# Patient Record
Sex: MALE | URBAN - METROPOLITAN AREA
[De-identification: names, ages, dates, MRNs, and addresses within clinical notes are randomized per-mention and may not be internally consistent; named-entity substitution may affect disease eponyms.]

---

## 2017-07-27 ENCOUNTER — OFFICE VISIT (OUTPATIENT)
Dept: MEDICAL GROUP | Facility: PHYSICIAN GROUP | Age: 82
End: 2017-07-27
Payer: MEDICARE

## 2017-07-27 VITALS
SYSTOLIC BLOOD PRESSURE: 124 MMHG | HEIGHT: 71 IN | TEMPERATURE: 98.4 F | WEIGHT: 202 LBS | DIASTOLIC BLOOD PRESSURE: 68 MMHG | OXYGEN SATURATION: 92 % | BODY MASS INDEX: 28.28 KG/M2 | HEART RATE: 70 BPM

## 2017-07-27 DIAGNOSIS — Z00.00 WELL ADULT EXAM: ICD-10-CM

## 2017-07-27 DIAGNOSIS — E78.00 PURE HYPERCHOLESTEROLEMIA: ICD-10-CM

## 2017-07-27 DIAGNOSIS — R41.3 MEMORY LOSS: ICD-10-CM

## 2017-07-27 DIAGNOSIS — Z23 NEED FOR TDAP VACCINATION: ICD-10-CM

## 2017-07-27 DIAGNOSIS — I10 BENIGN ESSENTIAL HTN: ICD-10-CM

## 2017-07-27 DIAGNOSIS — Z86.73 HISTORY OF TIA (TRANSIENT ISCHEMIC ATTACK): ICD-10-CM

## 2017-07-27 DIAGNOSIS — Z23 NEED FOR PNEUMOCOCCAL VACCINATION: ICD-10-CM

## 2017-07-27 DIAGNOSIS — Z00.00 MEDICARE ANNUAL WELLNESS VISIT, SUBSEQUENT: ICD-10-CM

## 2017-07-27 PROCEDURE — 90670 PCV13 VACCINE IM: CPT | Performed by: FAMILY MEDICINE

## 2017-07-27 PROCEDURE — 90472 IMMUNIZATION ADMIN EACH ADD: CPT | Performed by: FAMILY MEDICINE

## 2017-07-27 PROCEDURE — 90715 TDAP VACCINE 7 YRS/> IM: CPT | Performed by: FAMILY MEDICINE

## 2017-07-27 PROCEDURE — G0009 ADMIN PNEUMOCOCCAL VACCINE: HCPCS | Performed by: FAMILY MEDICINE

## 2017-07-27 PROCEDURE — G0439 PPPS, SUBSEQ VISIT: HCPCS | Mod: 25 | Performed by: FAMILY MEDICINE

## 2017-07-27 RX ORDER — CITALOPRAM HYDROBROMIDE 10 MG/1
10 TABLET ORAL DAILY
Qty: 90 TAB | Refills: 3 | Status: SHIPPED | OUTPATIENT
Start: 2017-07-27

## 2017-07-27 RX ORDER — PRAVASTATIN SODIUM 10 MG
10 TABLET ORAL DAILY
Qty: 90 TAB | Refills: 3 | Status: SHIPPED | OUTPATIENT
Start: 2017-07-27

## 2017-07-27 RX ORDER — DONEPEZIL HYDROCHLORIDE 5 MG/1
5 TABLET, FILM COATED ORAL EVERY EVENING
Qty: 30 TAB | Refills: 3 | Status: SHIPPED | OUTPATIENT
Start: 2017-07-27 | End: 2017-11-26 | Stop reason: SDUPTHER

## 2017-07-27 NOTE — MR AVS SNAPSHOT
"        Sami Modi   2017 1:30 PM   Office Visit   MRN: [id number]    Department:  [facility]    Dept Phone:  [phone]    Description:  Male : 1927   Provider:  Ren Tellez M.D.           Allergies as of 2017     No Known Allergies      You were diagnosed with     Medicare annual wellness visit, subsequent   [736201]       Need for Tdap vaccination   [064487]       Need for pneumococcal vaccination   [874459]       Well adult exam   [584218]       History of TIA (transient ischemic attack)   [699175]       Pure hypercholesterolemia   [272.0.ICD-9-CM]       Benign essential HTN   [124003]       Memory loss   [780.93.ICD-9-CM]         Vital Signs     Blood Pressure Pulse Temperature Height Weight Body Mass Index    124/68 mmHg 70 36.9 °C (98.4 °F) 1.803 m (5' 11\") 91.627 kg (202 lb) 28.19 kg/m2    Oxygen Saturation Smoking Status                92% Never Smoker           Basic Information     Date Of Birth Sex Race Ethnicity Preferred Language    1927 Male Unable to Obtain Unknown English      Problem List              ICD-10-CM Priority Class Noted - Resolved    History of TIA (transient ischemic attack) Z86.73   2016 - Present    HLD (hyperlipidemia) E78.5   2016 - Present    Benign essential HTN I10   2016 - Present      Health Maintenance        Date Due Completion Dates    IMM DTaP/Tdap/Td Vaccine (1 - Tdap) 1946 ---    COLONOSCOPY 1977 ---    IMM ZOSTER VACCINE 1987 ---    IMM PNEUMOCOCCAL 65+ (ADULT) LOW/MEDIUM RISK SERIES (1 of 2 - PCV13) 1992 ---    IMM INFLUENZA (1) 2017 ---            Current Immunizations     13-VALENT PCV PREVNAR 2017    Tdap Vaccine 2017      Below and/or attached are the medications your provider expects you to take. Review all of your home medications and newly ordered medications with your provider and/or pharmacist. Follow medication instructions as directed by your provider and/or pharmacist. " Please keep your medication list with you and share with your provider. Update the information when medications are discontinued, doses are changed, or new medications (including over-the-counter products) are added; and carry medication information at all times in the event of emergency situations     Allergies:  No Known Allergies          Medications  Valid as of: July 27, 2017 -  4:05 PM    Generic Name Brand Name Tablet Size Instructions for use    Aspirin (Tablet Delayed Response) ECOTRIN 81 MG Take 81 mg by mouth every day.        Cholecalciferol (Cap) Vitamin D3 1000 UNITS Take  by mouth.        Citalopram Hydrobromide (Tab) CELEXA 10 MG Take 1 Tab by mouth every day.        Cyanocobalamin (Cap) B-12 1000 MCG Take  by mouth.        Donepezil HCl (Tab) ARICEPT 5 MG Take 1 Tab by mouth every evening.        Magnesium Gluconate (Tab) MAG-G 500 MG Take 500 mg by mouth 3 times a day.        Metoprolol Tartrate (Tab) LOPRESSOR 25 MG Take 1 Tab by mouth 2 times a day.        Multiple Vitamins-Minerals   Take  by mouth.        Pravastatin Sodium (Tab) PRAVACHOL 10 MG Take 1 Tab by mouth every day.        Triamcinolone Acetonide (Cream) KENALOG 0.025 % Apply 1 Application to affected area(s) 2 times a day.        .                 Medicines prescribed today were sent to:     [facility] #[id number] - [place] [address] [address] [address]    Phone: [phone] Fax: [phone]    Open 24 Hours?: No      Medication refill instructions:       If your prescription bottle indicates you have medication refills left, it is not necessary to call your provider’s office. Please contact your pharmacy and they will refill your medication.    If your prescription bottle indicates you do not have any refills left, you may request refills at any time through one of the following ways: The online Perfectore system (except Urgent Care), by calling your provider’s office, or by asking your  pharmacy to contact your provider’s office with a refill request. Medication refills are processed only during regular business hours and may not be available until the next business day. Your provider may request additional information or to have a follow-up visit with you prior to refilling your medication.   *Please Note: Medication refills are assigned a new Rx number when refilled electronically. Your pharmacy may indicate that no refills were authorized even though a new prescription for the same medication is available at the pharmacy. Please request the medicine by name with the pharmacy before contacting your provider for a refill.        Your To Do List     Future Labs/Procedures Complete By Expires    CBC WITHOUT DIFFERENTIAL  As directed 1/27/2018    COMP METABOLIC PANEL  As directed 7/27/2018    FREE THYROXINE  As directed 7/27/2018    LIPID PROFILE  As directed 7/27/2018    OCCULT BLOOD FECES IMMUNOASSAY  As directed 7/27/2018    TRIIDOTHYRONINE  As directed 7/27/2018    TSH  As directed 7/27/2018    VITAMIN D,25 HYDROXY  As directed 7/27/2018         BrandProject Access Code: [email or web address]  Expires: 8/26/2017  4:05 PM    BrandProject  A secure, online tool to manage your health information     [employer]’s BrandProject® is a secure, online tool that connects you to your personalized health information from the privacy of your home -- day or night - making it very easy for you to manage your healthcare. Once the activation process is completed, you can even access your medical information using the BrandProject greg, which is available for free in the Apple Greg store or Google Play store.     BrandProject provides the following levels of access (as shown below):   My Chart Features   [facility] Doctor [facility]  Specialists [facility]  Urgent  Care [facility]  Primary Care  Doctor   Email your healthcare team securely and privately 24/7 X X X    Manage appointments: schedule your next appointment; view details of  past/upcoming appointments X      Request prescription refills. X      View recent personal medical records, including lab and immunizations X X X X   View health record, including health history, allergies, medications X X X X   Read reports about your outpatient visits, procedures, consult and ER notes X X X X   See your discharge summary, which is a recap of your hospital and/or ER visit that includes your diagnosis, lab results, and care plan. X X       How to register for Navigating Cancer:  1. Go to  [email or web address].  2. Click on the Sign Up Now box, which takes you to the New Member Sign Up page. You will need to provide the following information:  a. Enter your Navigating Cancer Access Code exactly as it appears at the top of this page. (You will not need to use this code after you’ve completed the sign-up process. If you do not sign up before the expiration date, you must request a new code.)   b. Enter your date of birth.   c. Enter your home email address.   d. Click Submit, and follow the next screen’s instructions.  3. Create a Navigating Cancer ID. This will be your Navigating Cancer login ID and cannot be changed, so think of one that is secure and easy to remember.  4. Create a Navigating Cancer password. You can change your password at any time.  5. Enter your Password Reset Question and Answer. This can be used at a later time if you forget your password.   6. Enter your e-mail address. This allows you to receive e-mail notifications when new information is available in Navigating Cancer.  7. Click Sign Up. You can now view your health information.    For assistance activating your Navigating Cancer account, call [phone]

## 2017-07-27 NOTE — PROGRESS NOTES
No chief complaint on file.        HPI:  Sami is a 89 y.o. here for Medicare Annual Wellness Visit        Patient Active Problem List    Diagnosis Date Noted   • History of TIA (transient ischemic attack) 07/27/2016   • HLD (hyperlipidemia) 07/27/2016   • Benign essential HTN 07/27/2016   • Low back pain        Current Outpatient Prescriptions   Medication Sig Dispense Refill   • Multiple Vitamins-Minerals (CENTRUM PO) Take  by mouth.     • aspirin EC (ECOTRIN) 81 MG Tablet Delayed Response Take 81 mg by mouth every day.     • Cholecalciferol (VITAMIN D3) 1000 UNITS Cap Take  by mouth.     • Cyanocobalamin (B-12) 1000 MCG Cap Take  by mouth.     • pravastatin (PRAVACHOL) 10 MG Tab Take 1 Tab by mouth every day. 90 Tab 3   • metoprolol (LOPRESSOR) 25 MG Tab Take 1 Tab by mouth 2 times a day. 180 Tab 3   • citalopram (CELEXA) 10 MG tablet Take 1 Tab by mouth every day. 90 Tab 3   • magnesium gluconate (MAG-G) 500 MG tablet Take 500 mg by mouth 3 times a day.     • triamcinolone acetonide (KENALOG) 0.025 % Cream Apply 1 Application to affected area(s) 2 times a day. 1 Tube 3     No current facility-administered medications for this visit.        The patient is not taking no medication(s) due to:  no prescription for the medication.  Current supplements as per medication list.     Allergies: Review of patient's allergies indicates no known allergies.    Current social contact/activities: likes to read a book or play on the computer.    Is patient current with immunizations? No, due for PREVNAR (PCV13) . Patient is interested in receiving PREVNAR (PCV13)  today.    He  reports that he has never smoked. He has never used smokeless tobacco. He reports that he does not drink alcohol or use illicit drugs.  Counseling given: Not Answered        DPA/Advanced directive: Patient does not have an Advanced Directive.  A packet and workshop information was given on Advanced Directives.    ROS:    Gait: Uses a cane yes  Ostomy: no  no  Other tubes: no no  Amputations: no no  Chronic oxygen use no no  Last eye exam 2/5/2014  Wears hearing aids: no yes  : Reports incontinence. yes      Screening:      Depression Screening    Little interest or pleasure in doing things?   0  Feeling down, depressed, or hopeless?  0  Trouble falling or staying asleep, or sleeping too much?   0  Feeling tired or having little energy?   0  Poor appetite or overeating?     Feeling bad about yourself - or that y0ou are a failure or have let yourself or your family down?    Trouble concentrating on things, such as reading the newspaper or watching television?    Moving or speaking so slowly that other people could have noticed.  Or the opposite - being so fidgety or restless that you have been moving around a lot more than usual?     Thoughts that you would be better off dead, or of hurting yourself?     Patient Health Questionnaire Score:        If depressive symptoms identified deferred to follow up visit unless specifically addressed in assessment and plan.    Interpretation of PHQ-9 Total Score   Score Severity   1-4 No Depression   5-9 Mild Depression   10-14 Moderate Depression   15-19 Moderately Severe Depression   20-27 Severe Depression      Screening for Cognitive Impairment    Three Minute Recall (apple, watch, singh)   0/3    Draw clock face with all 12 numbers set to the hand to show 10 minutes past 11 o'clock       If cognitive concerns identified, deferred for follow up unless specifically addressed in assessment and plan.    Fall Risk Assessment    Has the patient had two or more falls in the last year or any fall with injury in the last year?   no  If fall risk identified, deferred for follow up unless specifically addressed in assessment and plan.      Safety Assessment    Throw rugs on floor.   yes  Handrails on all stairs.   No stairs  Good lighting in all hallways.   yes  Difficulty hearing.   yes  Patient counseled about all safety risks that  "were identified.    Functional Assessment ADLs    Are there any barriers preventing you from cooking for yourself or meeting nutritional needs?   .  no  Are there any barriers preventing you from driving safely or obtaining transportation?   .  no  Are there any barriers preventing you from using a telephone or calling for help?   .  yes  Are there any barriers preventing you from shopping?   .  yes  Are there any barriers preventing you from taking care of your own finances?   .  yes  Are there any barriers preventing you from managing your medications?   .  yes  Are you currently engaging any exercise or physical activity?   .   no   Health Maintenance Summary                Annual Wellness Visit Overdue 7/27/1927     IMM DTaP/Tdap/Td Vaccine Overdue 7/27/1946     COLONOSCOPY Overdue 7/27/1977     IMM ZOSTER VACCINE Overdue 7/27/1987     IMM PNEUMOCOCCAL 65+ (ADULT) LOW/MEDIUM RISK SERIES Overdue 7/27/1992     IMM INFLUENZA Next Due 9/1/2017           Patient Care Team:  Ren Tellez M.D. as PCP - General (Family Medicine)      Social History   Substance Use Topics   • Smoking status: Never Smoker    • Smokeless tobacco: Never Used   • Alcohol Use: No     History reviewed. No pertinent family history.  He  has a past medical history of Hyperlipidemia; Hypertension; LBP (low back pain); and Stroke (CMS-HCC).   Past Surgical History   Procedure Laterality Date   • Abdominal exploration     • Appendectomy     • Bowel resection       due to sbo         Exam:     Blood pressure 124/68, pulse 70, temperature 36.9 °C (98.4 °F), height 1.803 m (5' 11\"), weight 91.627 kg (202 lb), SpO2 92 %. Body mass index is 28.19 kg/(m^2).    Hearing fair.    Dentition fair  Alert, oriented in no acute distress.  Eye contact is good, speech goal directed, affect calm      Assessment and Plan. The following treatment and monitoring plan is recommended:      1. Medicare annual wellness visit, subsequent    - OCCULT BLOOD FECES " IMMUNOASSAY; Future  - COMP METABOLIC PANEL; Future  - FREE THYROXINE; Future  - TRIIDOTHYRONINE; Future  - TSH; Future  - VITAMIN D,25 HYDROXY; Future  - LIPID PROFILE; Future  - CBC WITHOUT DIFFERENTIAL; Future    2. Need for Tdap vaccination    - OCCULT BLOOD FECES IMMUNOASSAY; Future  - COMP METABOLIC PANEL; Future  - FREE THYROXINE; Future  - TRIIDOTHYRONINE; Future  - TSH; Future  - VITAMIN D,25 HYDROXY; Future  - LIPID PROFILE; Future  - CBC WITHOUT DIFFERENTIAL; Future    3. Need for pneumococcal vaccination    - OCCULT BLOOD FECES IMMUNOASSAY; Future  - COMP METABOLIC PANEL; Future  - FREE THYROXINE; Future  - TRIIDOTHYRONINE; Future  - TSH; Future  - VITAMIN D,25 HYDROXY; Future  - LIPID PROFILE; Future  - CBC WITHOUT DIFFERENTIAL; Future    4. Well adult exam    - OCCULT BLOOD FECES IMMUNOASSAY; Future  - COMP METABOLIC PANEL; Future  - FREE THYROXINE; Future  - TRIIDOTHYRONINE; Future  - TSH; Future  - VITAMIN D,25 HYDROXY; Future  - LIPID PROFILE; Future  - CBC WITHOUT DIFFERENTIAL; Future    5. History of TIA (transient ischemic attack)    - OCCULT BLOOD FECES IMMUNOASSAY; Future  - COMP METABOLIC PANEL; Future  - FREE THYROXINE; Future  - TRIIDOTHYRONINE; Future  - TSH; Future  - VITAMIN D,25 HYDROXY; Future  - LIPID PROFILE; Future  - CBC WITHOUT DIFFERENTIAL; Future    6. Pure hypercholesterolemia  Currently treated for HLD, taking meds with no new myalgias or joint pain, watching fats in diet  controlled      - OCCULT BLOOD FECES IMMUNOASSAY; Future  - COMP METABOLIC PANEL; Future  - FREE THYROXINE; Future  - TRIIDOTHYRONINE; Future  - TSH; Future  - VITAMIN D,25 HYDROXY; Future  - LIPID PROFILE; Future  - CBC WITHOUT DIFFERENTIAL; Future    7. Benign essential HTN  Currently treated for HTN, taking meds with no CP or sob, monitors bp at home periodically. controlled      - OCCULT BLOOD FECES IMMUNOASSAY; Future  - COMP METABOLIC PANEL; Future  - FREE THYROXINE; Future  - TRIIDOTHYRONINE; Future  -  TSH; Future  - VITAMIN D,25 HYDROXY; Future  - LIPID PROFILE; Future  - CBC WITHOUT DIFFERENTIAL; Future      8. Memory loss - patient and wife agree that his memory both long and short term is poor and agree to do a trial of aricept for memory    Past Medical History   Diagnosis Date   • Hyperlipidemia    • Hypertension    • LBP (low back pain)    • Stroke (CMS-HCC)      Past Surgical History   Procedure Laterality Date   • Abdominal exploration     • Appendectomy     • Bowel resection       due to sbo     Social History   Substance Use Topics   • Smoking status: Never Smoker    • Smokeless tobacco: Never Used   • Alcohol Use: No     History reviewed. No pertinent family history.      Current outpatient prescriptions:   •  Multiple Vitamins-Minerals (CENTRUM PO), Take  by mouth., Disp: , Rfl:   •  aspirin EC (ECOTRIN) 81 MG Tablet Delayed Response, Take 81 mg by mouth every day., Disp: , Rfl:   •  Cholecalciferol (VITAMIN D3) 1000 UNITS Cap, Take  by mouth., Disp: , Rfl:   •  Cyanocobalamin (B-12) 1000 MCG Cap, Take  by mouth., Disp: , Rfl:   •  citalopram (CELEXA) 10 MG tablet, Take 1 Tab by mouth every day., Disp: 90 Tab, Rfl: 3  •  pravastatin (PRAVACHOL) 10 MG Tab, Take 1 Tab by mouth every day., Disp: 90 Tab, Rfl: 3  •  metoprolol (LOPRESSOR) 25 MG Tab, Take 1 Tab by mouth 2 times a day., Disp: 180 Tab, Rfl: 3  •  magnesium gluconate (MAG-G) 500 MG tablet, Take 500 mg by mouth 3 times a day., Disp: , Rfl:   •  triamcinolone acetonide (KENALOG) 0.025 % Cream, Apply 1 Application to affected area(s) 2 times a day., Disp: 1 Tube, Rfl: 3        1. Medicare annual wellness visit, subsequent    - OCCULT BLOOD FECES IMMUNOASSAY; Future  - COMP METABOLIC PANEL; Future  - FREE THYROXINE; Future  - TRIIDOTHYRONINE; Future  - TSH; Future  - VITAMIN D,25 HYDROXY; Future  - LIPID PROFILE; Future  - CBC WITHOUT DIFFERENTIAL; Future    2. Need for Tdap vaccination    - OCCULT BLOOD FECES IMMUNOASSAY; Future  - COMP METABOLIC  PANEL; Future  - FREE THYROXINE; Future  - TRIIDOTHYRONINE; Future  - TSH; Future  - VITAMIN D,25 HYDROXY; Future  - LIPID PROFILE; Future  - CBC WITHOUT DIFFERENTIAL; Future    3. Need for pneumococcal vaccination    - OCCULT BLOOD FECES IMMUNOASSAY; Future  - COMP METABOLIC PANEL; Future  - FREE THYROXINE; Future  - TRIIDOTHYRONINE; Future  - TSH; Future  - VITAMIN D,25 HYDROXY; Future  - LIPID PROFILE; Future  - CBC WITHOUT DIFFERENTIAL; Future    4. Well adult exam    - OCCULT BLOOD FECES IMMUNOASSAY; Future  - COMP METABOLIC PANEL; Future  - FREE THYROXINE; Future  - TRIIDOTHYRONINE; Future  - TSH; Future  - VITAMIN D,25 HYDROXY; Future  - LIPID PROFILE; Future  - CBC WITHOUT DIFFERENTIAL; Future    5. History of TIA (transient ischemic attack)    - OCCULT BLOOD FECES IMMUNOASSAY; Future  - COMP METABOLIC PANEL; Future  - FREE THYROXINE; Future  - TRIIDOTHYRONINE; Future  - TSH; Future  - VITAMIN D,25 HYDROXY; Future  - LIPID PROFILE; Future  - CBC WITHOUT DIFFERENTIAL; Future    6. Pure hypercholesterolemia    - OCCULT BLOOD FECES IMMUNOASSAY; Future  - COMP METABOLIC PANEL; Future  - FREE THYROXINE; Future  - TRIIDOTHYRONINE; Future  - TSH; Future  - VITAMIN D,25 HYDROXY; Future  - LIPID PROFILE; Future  - CBC WITHOUT DIFFERENTIAL; Future    7. Benign essential HTN    - OCCULT BLOOD FECES IMMUNOASSAY; Future  - COMP METABOLIC PANEL; Future  - FREE THYROXINE; Future  - TRIIDOTHYRONINE; Future  - TSH; Future  - VITAMIN D,25 HYDROXY; Future  - LIPID PROFILE; Future  - CBC WITHOUT DIFFERENTIAL; Future    Past Medical History   Diagnosis Date   • Hyperlipidemia    • Hypertension    • LBP (low back pain)    • Stroke (CMS-HCC)      Past Surgical History   Procedure Laterality Date   • Abdominal exploration     • Appendectomy     • Bowel resection       due to sbo     Social History   Substance Use Topics   • Smoking status: Never Smoker    • Smokeless tobacco: Never Used   • Alcohol Use: No     History reviewed. No  pertinent family history.      Current outpatient prescriptions:   •  Multiple Vitamins-Minerals (CENTRUM PO), Take  by mouth., Disp: , Rfl:   •  aspirin EC (ECOTRIN) 81 MG Tablet Delayed Response, Take 81 mg by mouth every day., Disp: , Rfl:   •  Cholecalciferol (VITAMIN D3) 1000 UNITS Cap, Take  by mouth., Disp: , Rfl:   •  Cyanocobalamin (B-12) 1000 MCG Cap, Take  by mouth., Disp: , Rfl:   •  citalopram (CELEXA) 10 MG tablet, Take 1 Tab by mouth every day., Disp: 90 Tab, Rfl: 3  •  pravastatin (PRAVACHOL) 10 MG Tab, Take 1 Tab by mouth every day., Disp: 90 Tab, Rfl: 3  •  metoprolol (LOPRESSOR) 25 MG Tab, Take 1 Tab by mouth 2 times a day., Disp: 180 Tab, Rfl: 3  •  magnesium gluconate (MAG-G) 500 MG tablet, Take 500 mg by mouth 3 times a day., Disp: , Rfl:   •  triamcinolone acetonide (KENALOG) 0.025 % Cream, Apply 1 Application to affected area(s) 2 times a day., Disp: 1 Tube, Rfl: 3        Services suggested: No services needed at this time  Health Care Screening recommendations as per orders if indicated.  Referrals offered: PT/OT/Nutrition counseling/Behavioral Health/Smoking cessation as per orders if indicated.    Discussion today about general wellness and lifestyle habits:    · Prevent falls and reduce trip hazards; Cautioned about securing or removing rugs.  · Have a working fire alarm and carbon monoxide detector;   · Engage in regular physical activity and social activities       Follow-up: No Follow-up on file.

## 2017-07-28 ENCOUNTER — HOSPITAL ENCOUNTER (OUTPATIENT)
Dept: LAB | Facility: MEDICAL CENTER | Age: 82
End: 2017-07-28
Attending: FAMILY MEDICINE
Payer: MEDICARE

## 2017-07-28 DIAGNOSIS — Z00.00 WELL ADULT EXAM: ICD-10-CM

## 2017-07-28 DIAGNOSIS — Z23 NEED FOR TDAP VACCINATION: ICD-10-CM

## 2017-07-28 DIAGNOSIS — Z00.00 MEDICARE ANNUAL WELLNESS VISIT, SUBSEQUENT: ICD-10-CM

## 2017-07-28 DIAGNOSIS — Z86.73 HISTORY OF TIA (TRANSIENT ISCHEMIC ATTACK): ICD-10-CM

## 2017-07-28 DIAGNOSIS — I10 BENIGN ESSENTIAL HTN: ICD-10-CM

## 2017-07-28 DIAGNOSIS — E78.00 PURE HYPERCHOLESTEROLEMIA: ICD-10-CM

## 2017-07-28 DIAGNOSIS — Z23 NEED FOR PNEUMOCOCCAL VACCINATION: ICD-10-CM

## 2017-07-28 LAB
25(OH)D3 SERPL-MCNC: 32 NG/ML (ref 30–100)
ALBUMIN SERPL BCP-MCNC: 3.8 G/DL (ref 3.2–4.9)
ALBUMIN/GLOB SERPL: 1.3 G/DL
ALP SERPL-CCNC: 59 U/L (ref 30–99)
ALT SERPL-CCNC: 12 U/L (ref 2–50)
ANION GAP SERPL CALC-SCNC: 5 MMOL/L (ref 0–11.9)
AST SERPL-CCNC: 19 U/L (ref 12–45)
BILIRUB SERPL-MCNC: 1 MG/DL (ref 0.1–1.5)
BUN SERPL-MCNC: 14 MG/DL (ref 8–22)
CALCIUM SERPL-MCNC: 9.3 MG/DL (ref 8.5–10.5)
CHLORIDE SERPL-SCNC: 96 MMOL/L (ref 96–112)
CHOLEST SERPL-MCNC: 163 MG/DL (ref 100–199)
CO2 SERPL-SCNC: 26 MMOL/L (ref 20–33)
CREAT SERPL-MCNC: 0.99 MG/DL (ref 0.5–1.4)
ERYTHROCYTE [DISTWIDTH] IN BLOOD BY AUTOMATED COUNT: 49.6 FL (ref 35.9–50)
GFR SERPL CREATININE-BSD FRML MDRD: >60 ML/MIN/1.73 M 2
GLOBULIN SER CALC-MCNC: 2.9 G/DL (ref 1.9–3.5)
GLUCOSE SERPL-MCNC: 82 MG/DL (ref 65–99)
HCT VFR BLD AUTO: 41.8 % (ref 42–52)
HDLC SERPL-MCNC: 48 MG/DL
HGB BLD-MCNC: 13.8 G/DL (ref 14–18)
LDLC SERPL CALC-MCNC: 98 MG/DL
MCH RBC QN AUTO: 33.9 PG (ref 27–33)
MCHC RBC AUTO-ENTMCNC: 33 G/DL (ref 33.7–35.3)
MCV RBC AUTO: 102.7 FL (ref 81.4–97.8)
PLATELET # BLD AUTO: 240 K/UL (ref 164–446)
PMV BLD AUTO: 9.9 FL (ref 9–12.9)
POTASSIUM SERPL-SCNC: 3.9 MMOL/L (ref 3.6–5.5)
PROT SERPL-MCNC: 6.7 G/DL (ref 6–8.2)
RBC # BLD AUTO: 4.07 M/UL (ref 4.7–6.1)
SODIUM SERPL-SCNC: 127 MMOL/L (ref 135–145)
T3 SERPL-MCNC: 74.5 NG/DL (ref 60–181)
T4 FREE SERPL-MCNC: 0.92 NG/DL (ref 0.53–1.43)
TRIGL SERPL-MCNC: 84 MG/DL (ref 0–149)
TSH SERPL DL<=0.005 MIU/L-ACNC: 4.84 UIU/ML (ref 0.3–3.7)
WBC # BLD AUTO: 7.8 K/UL (ref 4.8–10.8)

## 2017-07-28 PROCEDURE — 80053 COMPREHEN METABOLIC PANEL: CPT

## 2017-07-28 PROCEDURE — 85027 COMPLETE CBC AUTOMATED: CPT

## 2017-07-28 PROCEDURE — 80061 LIPID PANEL: CPT

## 2017-07-28 PROCEDURE — 84480 ASSAY TRIIODOTHYRONINE (T3): CPT

## 2017-07-28 PROCEDURE — 36415 COLL VENOUS BLD VENIPUNCTURE: CPT

## 2017-07-28 PROCEDURE — 84443 ASSAY THYROID STIM HORMONE: CPT

## 2017-07-28 PROCEDURE — 82306 VITAMIN D 25 HYDROXY: CPT

## 2017-07-28 PROCEDURE — 84439 ASSAY OF FREE THYROXINE: CPT

## 2017-08-01 ENCOUNTER — TELEPHONE (OUTPATIENT)
Dept: MEDICAL GROUP | Facility: PHYSICIAN GROUP | Age: 82
End: 2017-08-01

## 2017-08-01 NOTE — TELEPHONE ENCOUNTER
Pt's wife called and left a voicemail informing me that her  went to the hospital on 04/06/17 for kidney/ blood infection that his wife wanted to let the doc know will request records

## 2017-08-01 NOTE — TELEPHONE ENCOUNTER
Spoke to patient informed patient of message below. Patient understood and thanked me for the call.

## 2017-08-01 NOTE — TELEPHONE ENCOUNTER
----- Message from Ren Tellez M.D. sent at 7/31/2017  9:06 AM PDT -----  Sodium remains a bit low continue with extra salt in his diet

## 2017-11-26 DIAGNOSIS — R41.3 MEMORY LOSS: ICD-10-CM

## 2017-11-27 RX ORDER — DONEPEZIL HYDROCHLORIDE 5 MG/1
TABLET, FILM COATED ORAL
Qty: 30 TAB | Refills: 2 | Status: SHIPPED | OUTPATIENT
Start: 2017-11-27

## 2017-11-27 NOTE — TELEPHONE ENCOUNTER
Was the patient seen in the last year in this department? Yes     Does patient have an active prescription for medications requested? No     Received Request Via: Pharmacy   Last seen  7/27/17    Last labs  7/28/17

## 2018-01-25 ENCOUNTER — TELEPHONE (OUTPATIENT)
Dept: MEDICAL GROUP | Facility: PHYSICIAN GROUP | Age: 83
End: 2018-01-25

## 2018-01-26 ENCOUNTER — HOME HEALTH ADMISSION (OUTPATIENT)
Dept: HOME HEALTH SERVICES | Facility: HOME HEALTHCARE | Age: 83
End: 2018-01-26
Payer: MEDICARE

## 2018-01-29 ENCOUNTER — TELEPHONE (OUTPATIENT)
Dept: MEDICAL GROUP | Facility: PHYSICIAN GROUP | Age: 83
End: 2018-01-29

## 2018-01-29 NOTE — TELEPHONE ENCOUNTER
Called patient but spoke to his wife regarding a appointment and according to the wife he was at [facility] and he was going to be transported to a [facility]. She stated that she has an appointment to see the provider this week and will schedule an appointment when shes in the office.

## 2018-01-29 NOTE — TELEPHONE ENCOUNTER
----- Message from Ren Tellez M.D. sent at 1/26/2018 11:39 AM PST -----  Make him an appt please  ----- Message -----  From: Temo Rincon  Sent: 1/26/2018   8:18 AM  To: Marta Bedolla, Sofiya Nolasco, #    Per Medicare guidelines the patient must have a face to face encounter scheduled with your office within the next 30 days in order for the HH referral to be accepted. Please notify home health once the appointment is scheduled so we may process your referral.     Thank you

## 2018-05-14 ENCOUNTER — TELEPHONE (OUTPATIENT)
Dept: MEDICAL GROUP | Facility: PHYSICIAN GROUP | Age: 83
End: 2018-05-14

## 2018-05-14 DIAGNOSIS — Z86.73 HISTORY OF TIA (TRANSIENT ISCHEMIC ATTACK): ICD-10-CM

## 2018-05-15 NOTE — TELEPHONE ENCOUNTER
Pt's wife called back and I informed her that her  needs an appointment. She states he will not come in at all and she does not know what to do

## 2018-06-01 ENCOUNTER — PATIENT OUTREACH (OUTPATIENT)
Dept: HEALTH INFORMATION MANAGEMENT | Facility: OTHER | Age: 83
End: 2018-06-01

## 2018-06-01 NOTE — PROGRESS NOTES
Outcome: Left Message    Please transfer to Patient Outreach Team at [phone] when patient returns call.    WebIZ Checked & Epic Updated:  no    [facility]Connect Verified: yes    Attempt # AWV PROJECT 1ST ATTEMPT

## 2018-10-10 ENCOUNTER — PATIENT OUTREACH (OUTPATIENT)
Dept: HEALTH INFORMATION MANAGEMENT | Facility: OTHER | Age: 83
End: 2018-10-10

## 2018-10-10 NOTE — PROGRESS NOTES
Outcome: Does Not Qualify for Care Management - Spoke with wife and she stated he no longer lives at the address [facility] file [facility] and  Kell Lyman handles everything [phone] & [phone]    Please transfer to Patient Outreach Team at [phone] when patient returns call.    WebIZ Checked & Epic Updated:  yes  [id number]   Td (adult), adsorbed   Influenza w/preserv.   Zoster Sam (Shingrix)     HealthConnect Verified: yes  Effective Date: 1/1/2018     Attempt # 1

## 2018-10-25 ENCOUNTER — TELEPHONE (OUTPATIENT)
Dept: MEDICAL GROUP | Facility: PHYSICIAN GROUP | Age: 83
End: 2018-10-25

## 2020-08-19 NOTE — TELEPHONE ENCOUNTER
Pt wife called and left a message regarding wanting a letter that explains the decline to the pts mental health. Is this something we need to schedule an appt for?   RX SENT TO PHARMACY